# Patient Record
Sex: MALE | URBAN - METROPOLITAN AREA
[De-identification: names, ages, dates, MRNs, and addresses within clinical notes are randomized per-mention and may not be internally consistent; named-entity substitution may affect disease eponyms.]

---

## 2019-09-03 ENCOUNTER — APPOINTMENT (RX ONLY)
Dept: URBAN - METROPOLITAN AREA CLINIC 34 | Facility: CLINIC | Age: 39
Setting detail: DERMATOLOGY
End: 2019-09-03

## 2019-09-03 DIAGNOSIS — L90.5 SCAR CONDITIONS AND FIBROSIS OF SKIN: ICD-10-CM

## 2019-09-03 DIAGNOSIS — Z41.9 ENCOUNTER FOR PROCEDURE FOR PURPOSES OTHER THAN REMEDYING HEALTH STATE, UNSPECIFIED: ICD-10-CM

## 2019-09-03 PROCEDURE — ? COUNSELING

## 2019-09-03 PROCEDURE — ? ADDITIONAL NOTES

## 2019-09-03 PROCEDURE — ? PRESCRIPTION

## 2019-09-03 PROCEDURE — ? COSMETIC CONSULTATION - MICRO-NEEDLING

## 2019-09-03 PROCEDURE — ? MICRONEEDLING

## 2019-09-03 RX ORDER — TRETINOIN 0.5 MG/G
CREAM TOPICAL
Qty: 1 | Refills: 1 | Status: ERX | COMMUNITY
Start: 2019-09-03

## 2019-09-03 RX ORDER — LIDOCAINE AND PRILOCAINE CREAM 25; 25 MG/G; MG/G
CREAM TOPICAL
Qty: 1 | Refills: 0 | Status: ERX | COMMUNITY
Start: 2019-09-03

## 2019-09-03 RX ADMIN — LIDOCAINE AND PRILOCAINE CREAM: 25; 25 CREAM TOPICAL at 18:45

## 2019-09-03 RX ADMIN — TRETINOIN: 0.5 CREAM TOPICAL at 18:46

## 2019-09-03 ASSESSMENT — LOCATION DETAILED DESCRIPTION DERM
LOCATION DETAILED: LEFT INFERIOR CENTRAL MALAR CHEEK
LOCATION DETAILED: RIGHT INFERIOR CENTRAL MALAR CHEEK

## 2019-09-03 ASSESSMENT — LOCATION ZONE DERM: LOCATION ZONE: FACE

## 2019-09-03 ASSESSMENT — LOCATION SIMPLE DESCRIPTION DERM
LOCATION SIMPLE: LEFT CHEEK
LOCATION SIMPLE: RIGHT CHEEK

## 2019-09-03 NOTE — PROCEDURE: MIPS QUALITY
Quality 130: Documentation Of Current Medications In The Medical Record: Current Medications Documented
Quality 431: Preventive Care And Screening: Unhealthy Alcohol Use - Screening: Patient screened for unhealthy alcohol use using a single question and scores less than 2 times per year
Quality 226: Preventive Care And Screening: Tobacco Use: Screening And Cessation Intervention: Patient screened for tobacco use and is an ex/non-smoker
Quality 111:Pneumonia Vaccination Status For Older Adults: Pneumococcal Vaccination not Administered or Previously Received, Reason not Otherwise Specified
Detail Level: Detailed
Quality 402: Tobacco Use And Help With Quitting Among Adolescents: Patient screened for tobacco and never smoked
Quality 131: Pain Assessment And Follow-Up: Pain assessment using a standardized tool is documented as negative, no follow-up plan required
Quality 110: Preventive Care And Screening: Influenza Immunization: Influenza Immunization Administered during Influenza season

## 2019-09-03 NOTE — PROCEDURE: ADDITIONAL NOTES
Additional Notes: CF advised patient that he would need subscission and microneedling. Patient is not on a topical regimen. CF advised patient that a topical regimen is going to be important. Advised patient that the alternative for microneedling would be laser therapy. Recommended he have a consultation with Dr. Michelle Mata on laser therapy. CF advised patient that if he does microneedling he will need closer to 6 sessions.\\n\\nPatient was advised that on his 5th microneedling session with us we will give him 120$ discount (free microneedling kit).
Detail Level: Simple

## 2019-09-03 NOTE — PROCEDURE: MICRONEEDLING
Price (Use Numbers Only, No Special Characters Or $): 778 Price (Use Numbers Only, No Special Characters Or $): 548

## 2019-09-03 NOTE — HPI: SCAR
How Severe Is Your Scar?: moderate
Is This A New Presentation, Or A Follow-Up?: Scars
Additional History: Patient states that he would like to discuss treatment options for scarring on his face. He states he has a mixture of acne scars and Also trauma scars. He states he had 1 session of microneedling approximately 1 month ago.